# Patient Record
Sex: MALE | Race: ASIAN | NOT HISPANIC OR LATINO
[De-identification: names, ages, dates, MRNs, and addresses within clinical notes are randomized per-mention and may not be internally consistent; named-entity substitution may affect disease eponyms.]

---

## 2022-07-20 ENCOUNTER — APPOINTMENT (OUTPATIENT)
Dept: ORTHOPEDIC SURGERY | Facility: CLINIC | Age: 47
End: 2022-07-20

## 2022-07-20 VITALS — HEIGHT: 69 IN | WEIGHT: 160 LBS | BODY MASS INDEX: 23.7 KG/M2

## 2022-07-20 DIAGNOSIS — Z78.9 OTHER SPECIFIED HEALTH STATUS: ICD-10-CM

## 2022-07-20 PROBLEM — Z00.00 ENCOUNTER FOR PREVENTIVE HEALTH EXAMINATION: Status: ACTIVE | Noted: 2022-07-20

## 2022-07-20 PROCEDURE — 72070 X-RAY EXAM THORAC SPINE 2VWS: CPT

## 2022-07-20 PROCEDURE — 99204 OFFICE O/P NEW MOD 45 MIN: CPT

## 2022-07-20 PROCEDURE — 72050 X-RAY EXAM NECK SPINE 4/5VWS: CPT

## 2022-07-20 RX ORDER — NAPROXEN 500 MG/1
500 TABLET ORAL
Qty: 60 | Refills: 1 | Status: ACTIVE | COMMUNITY
Start: 2022-07-20 | End: 1900-01-01

## 2022-07-20 RX ORDER — CYCLOBENZAPRINE HYDROCHLORIDE 5 MG/1
5 TABLET, FILM COATED ORAL
Qty: 90 | Refills: 1 | Status: ACTIVE | COMMUNITY
Start: 2022-07-20 | End: 1900-01-01

## 2022-07-20 NOTE — HISTORY OF PRESENT ILLNESS
[Neck] : neck [Mid-back] : mid-back [3] : 3 [Dull/Aching] : dull/aching [Localized] : localized [Intermittent] : intermittent [Bending forward] : bending forward [Physical therapy] : physical therapy [Full time] : Work status: full time [de-identified] : Pt seen by non-spine partners in the past\par \par 7/20/22- 47 y/o RHD M presents for chronic midline C spine pain that worsened after a maneuver by a physical therapist on Friday; no recent injuries. Since this incident, patient has been waking up with his heart racing and associated numbness in b/l hands that resolves after a few minutes. Reports episode of numbness in occipital region as well, but no headaches/dizziness. No shooting pain in b/l UEs. Aggravated by looking over his left shoulder.  Associated T spine pain, alleviated with rest. No prior neck surgeries. Denies b/b dysfunction. \par  [] : Post Surgical Visit: no [FreeTextEntry5] : patient complains of neck and upper back pain since \par no injury, pain worsen after physical therapy \par numbness and tingling in both hands [FreeTextEntry6] : stiff

## 2022-07-20 NOTE — IMAGING
[Straightening consistent with spasm] : Straightening consistent with spasm [Disc space narrowing] : Disc space narrowing [de-identified] : C spine \par Palpation: No spasm in traps, rhomboids, paracervicals. No midline or paraspinal T spine tenderness\par ROM: Full with mild stiffness\par Strength: 5/5 bilateral deltoid, biceps, triceps, wrist flexors, wrist extensors, , abductors\par Sensation: Sensation present to light touch bilateral C5-T1 distributions\par Reflexes: Negative Vail's bilaterally\par  [FreeTextEntry1] : No fracture/dislocation/subluxation

## 2022-07-20 NOTE — ASSESSMENT
[FreeTextEntry1] : PT, meds\par will monitor for radic\par follow up 6 weeks\par \par Muscle Relaxants- To help decrease muscle spasm and assist with pain relief. Advised of sedating effects and instructed not to drive, operate heavy machinery, or take with other sedating medications.\par \par NSAIDs- Patient warned of risk of medication to GI tract, increased blood pressure, cardiac risk, and risk of fluid retention.  Advised to clear medication with internist or PCP if any concurrent health problem with heart, blood pressure, or GI system exists.\par

## 2022-08-21 ENCOUNTER — EMERGENCY (EMERGENCY)
Facility: HOSPITAL | Age: 47
LOS: 0 days | Discharge: HOME | End: 2022-08-22
Attending: EMERGENCY MEDICINE

## 2022-08-21 VITALS
RESPIRATION RATE: 18 BRPM | WEIGHT: 160.06 LBS | OXYGEN SATURATION: 97 % | SYSTOLIC BLOOD PRESSURE: 144 MMHG | DIASTOLIC BLOOD PRESSURE: 84 MMHG | HEIGHT: 69 IN | HEART RATE: 71 BPM | TEMPERATURE: 98 F

## 2022-08-21 DIAGNOSIS — M54.50 LOW BACK PAIN, UNSPECIFIED: ICD-10-CM

## 2022-08-21 DIAGNOSIS — R22.0 LOCALIZED SWELLING, MASS AND LUMP, HEAD: ICD-10-CM
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
## 2022-08-21 DIAGNOSIS — S01.511A LACERATION WITHOUT FOREIGN BODY OF LIP, INITIAL ENCOUNTER: ICD-10-CM

## 2022-08-21 DIAGNOSIS — R55 SYNCOPE AND COLLAPSE: ICD-10-CM

## 2022-08-21 DIAGNOSIS — M54.2 CERVICALGIA: ICD-10-CM
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
## 2022-08-21 DIAGNOSIS — R00.1 BRADYCARDIA, UNSPECIFIED: ICD-10-CM

## 2022-08-21 PROCEDURE — 99285 EMERGENCY DEPT VISIT HI MDM: CPT

## 2022-08-21 PROCEDURE — 93010 ELECTROCARDIOGRAM REPORT: CPT

## 2022-08-21 RX ORDER — SODIUM CHLORIDE 9 MG/ML
1000 INJECTION INTRAMUSCULAR; INTRAVENOUS; SUBCUTANEOUS ONCE
Refills: 0 | Status: COMPLETED | OUTPATIENT
Start: 2022-08-21 | End: 2022-08-21

## 2022-08-21 NOTE — ED ADULT TRIAGE NOTE - CHIEF COMPLAINT QUOTE
Patient had syncopal episode this evening, got up to stretch and woke up on the floor. C/o pain to his lip and redness to his forehead. No pmh.

## 2022-08-21 NOTE — ED PROVIDER NOTE - OBJECTIVE STATEMENT
Pertinent PMH/PSH/FHx/SHx and Review of Systems contained within:  Patient presents to the ED for syncope.  Patient with brother who is an RN.  Today was sitting at his computer with feet up.  Says that he's been having lower back pain for weeks now and stood up to stretch, at which point he does not recall much else.  He woke up on the floor with blood in his mouth, denies any incontinence, did not feel weak or confused.  Denies h/o seizures in himself or family, no family h/o sudden cardiac death.  Says that the only new symptom he's been having is neck pain ever since a chiropractor worked on it a few weeks ago, denies any blurry vision, vertigo, headaches.  Denies chest pain, [place].    Relevant PMHx/SHx/SOCHx/FAMH:  Neck and back pain  Patient denies EtOH/tobacco/illicit substance use.    ROS: No fever/chills, No headache/photophobia/eye pain/changes in vision, No ear pain/sore throat/dysphagia, No chest pain/palpitations, no SOB/cough/wheeze/stridor, No abdominal pain, No N/V/D/melena, no dysuria/frequency/discharge, no rash, no changes in neurological status/function.

## 2022-08-21 NOTE — ED PROVIDER NOTE - PHYSICAL EXAMINATION
Gen: Alert, NAD, well appearing  Head: NC, AT, EOMI, normal lids/conjunctiva  ENT: normal hearing, patent oropharynx without erythema/exudate, uvula midline  Neck: +supple, no tenderness/meningismus/JVD, +Trachea midline  Pulm: Bilateral BS, normal resp effort, no wheeze/stridor/retractions  CV: RRR, no M/R/G, +dist pulses  Abd: soft, NT/ND, Negative [place] signs, +BS, no palpable masses  Mskel: no edema/erythema/cyanosis  Skin: no rash, warm/dry  Neuro: AAOx3, no apparent sensory/motor deficits, coordination intact Gen: Alert, NAD, well appearing  Head: NC, minor bump on left forehead, EOMI, normal lids/conjunctiva  ENT: normal hearing, patent oropharynx without erythema/exudate, uvula midline, +lower lip superficial laceration  Neck: +supple, no tenderness/meningismus/JVD, +Trachea midline  Pulm: Bilateral BS, normal resp effort, no wheeze/stridor/retractions  CV: RRR, no M/R/G, +dist pulses  Abd: soft, NT/ND, Negative [place] signs, +BS, no palpable masses  Mskel: no edema/erythema/cyanosis  Skin: no rash, warm/dry  Neuro: AAOx3, no apparent sensory/motor deficits, coordination intact

## 2022-08-21 NOTE — ED PROVIDER NOTE - PATIENT PORTAL LINK FT
You can access the FollowMyHealth Patient Portal offered by [facility] by registering at the following website: [email or web address]. By joining [facility]’s FollowMyHealth portal, you will also be able to view your health information using other applications (apps) compatible with our system.

## 2022-08-21 NOTE — ED PROVIDER NOTE - EKG ADDITIONAL INFORMATION FREE TEXT
sinus patience, rate 55, no st e/d, no twi, qtc 386 sinus patience, rate 55, no st e/d, no twi, qtc 386  Repeat EKG: sinus patience, rate 51, no st e/d, no twi, qtc 398

## 2022-08-22 VITALS
TEMPERATURE: 98 F | SYSTOLIC BLOOD PRESSURE: 111 MMHG | RESPIRATION RATE: 20 BRPM | DIASTOLIC BLOOD PRESSURE: 72 MMHG | HEART RATE: 60 BPM | OXYGEN SATURATION: 98 %

## 2022-08-22 LAB
ALBUMIN SERPL ELPH-MCNC: 3.9 G/DL — SIGNIFICANT CHANGE UP (ref 3.3–5)
ALP SERPL-CCNC: 101 U/L — SIGNIFICANT CHANGE UP (ref 40–120)
ALT FLD-CCNC: 44 U/L — SIGNIFICANT CHANGE UP (ref 12–78)
ANION GAP SERPL CALC-SCNC: 5 MMOL/L — SIGNIFICANT CHANGE UP (ref 5–17)
AST SERPL-CCNC: 17 U/L — SIGNIFICANT CHANGE UP (ref 15–37)
BASOPHILS # BLD AUTO: 0.03 K/UL — SIGNIFICANT CHANGE UP (ref 0–0.2)
BASOPHILS NFR BLD AUTO: 0.3 % — SIGNIFICANT CHANGE UP (ref 0–2)
BILIRUB SERPL-MCNC: 0.5 MG/DL — SIGNIFICANT CHANGE UP (ref 0.2–1.2)
BUN SERPL-MCNC: 24 MG/DL — HIGH (ref 7–23)
CALCIUM SERPL-MCNC: 8.7 MG/DL — SIGNIFICANT CHANGE UP (ref 8.5–10.1)
CHLORIDE SERPL-SCNC: 107 MMOL/L — SIGNIFICANT CHANGE UP (ref 96–108)
CO2 SERPL-SCNC: 29 MMOL/L — SIGNIFICANT CHANGE UP (ref 22–31)
CREAT SERPL-MCNC: 1.13 MG/DL — SIGNIFICANT CHANGE UP (ref 0.5–1.3)
EGFR: 82 ML/MIN/1.73M2 — SIGNIFICANT CHANGE UP
EOSINOPHIL # BLD AUTO: 0.27 K/UL — SIGNIFICANT CHANGE UP (ref 0–0.5)
EOSINOPHIL NFR BLD AUTO: 2.9 % — SIGNIFICANT CHANGE UP (ref 0–6)
GLUCOSE SERPL-MCNC: 108 MG/DL — HIGH (ref 70–99)
HCT VFR BLD CALC: 48.2 % — SIGNIFICANT CHANGE UP (ref 39–50)
HGB BLD-MCNC: 16 G/DL — SIGNIFICANT CHANGE UP (ref 13–17)
IMM GRANULOCYTES NFR BLD AUTO: 0.6 % — SIGNIFICANT CHANGE UP (ref 0–1.5)
LYMPHOCYTES # BLD AUTO: 2.38 K/UL — SIGNIFICANT CHANGE UP (ref 1–3.3)
LYMPHOCYTES # BLD AUTO: 25.3 % — SIGNIFICANT CHANGE UP (ref 13–44)
MAGNESIUM SERPL-MCNC: 2.5 MG/DL — SIGNIFICANT CHANGE UP (ref 1.6–2.6)
MCHC RBC-ENTMCNC: 26.8 PG — LOW (ref 27–34)
MCHC RBC-ENTMCNC: 33.2 G/DL — SIGNIFICANT CHANGE UP (ref 32–36)
MCV RBC AUTO: 80.6 FL — SIGNIFICANT CHANGE UP (ref 80–100)
MONOCYTES # BLD AUTO: 0.79 K/UL — SIGNIFICANT CHANGE UP (ref 0–0.9)
MONOCYTES NFR BLD AUTO: 8.4 % — SIGNIFICANT CHANGE UP (ref 2–14)
NEUTROPHILS # BLD AUTO: 5.87 K/UL — SIGNIFICANT CHANGE UP (ref 1.8–7.4)
NEUTROPHILS NFR BLD AUTO: 62.5 % — SIGNIFICANT CHANGE UP (ref 43–77)
NRBC # BLD: 0 /100 WBCS — SIGNIFICANT CHANGE UP (ref 0–0)
PLATELET # BLD AUTO: 226 K/UL — SIGNIFICANT CHANGE UP (ref 150–400)
POTASSIUM SERPL-MCNC: 4.2 MMOL/L — SIGNIFICANT CHANGE UP (ref 3.5–5.3)
POTASSIUM SERPL-SCNC: 4.2 MMOL/L — SIGNIFICANT CHANGE UP (ref 3.5–5.3)
PROT SERPL-MCNC: 7.9 GM/DL — SIGNIFICANT CHANGE UP (ref 6–8.3)
RBC # BLD: 5.98 M/UL — HIGH (ref 4.2–5.8)
RBC # FLD: 13 % — SIGNIFICANT CHANGE UP (ref 10.3–14.5)
SODIUM SERPL-SCNC: 141 MMOL/L — SIGNIFICANT CHANGE UP (ref 135–145)
TROPONIN I, HIGH SENSITIVITY RESULT: 5.2 NG/L — SIGNIFICANT CHANGE UP
TROPONIN I, HIGH SENSITIVITY RESULT: 5.4 NG/L — SIGNIFICANT CHANGE UP
WBC # BLD: 9.4 K/UL — SIGNIFICANT CHANGE UP (ref 3.8–10.5)
WBC # FLD AUTO: 9.4 K/UL — SIGNIFICANT CHANGE UP (ref 3.8–10.5)

## 2022-08-22 PROCEDURE — 93010 ELECTROCARDIOGRAM REPORT: CPT

## 2022-08-22 PROCEDURE — 70498 CT ANGIOGRAPHY NECK: CPT | Mod: 26,MA

## 2022-08-22 PROCEDURE — 71045 X-RAY EXAM CHEST 1 VIEW: CPT | Mod: 26

## 2022-08-22 PROCEDURE — 70496 CT ANGIOGRAPHY HEAD: CPT | Mod: 26,MA

## 2022-08-22 RX ADMIN — SODIUM CHLORIDE 1000 MILLILITER(S): 9 INJECTION INTRAMUSCULAR; INTRAVENOUS; SUBCUTANEOUS at 00:03

## 2022-08-22 NOTE — ED ADULT NURSE NOTE - OBJECTIVE STATEMENT
A&Ox4, c/o syncopal episode. Pt states he had his feet up on computer, and states having lower back pain. pt states he got up to stretch, and unable to recall past stretching. pt states he woke up on the floor. pt states neck pain, denies: chest pain, sob, nausea, vomiting, blurry vision, headache, weakness.

## 2022-09-17 PROBLEM — Z87.39 PERSONAL HISTORY OF OTHER DISEASES OF THE MUSCULOSKELETAL SYSTEM AND CONNECTIVE TISSUE: Chronic | Status: ACTIVE | Noted: 2022-08-22

## 2022-09-20 ENCOUNTER — APPOINTMENT (OUTPATIENT)
Dept: ORTHOPEDIC SURGERY | Facility: CLINIC | Age: 47
End: 2022-09-20

## 2022-09-20 VITALS — WEIGHT: 160 LBS | HEIGHT: 69 IN | BODY MASS INDEX: 23.7 KG/M2

## 2022-09-20 DIAGNOSIS — M62.830 MUSCLE SPASM OF BACK: ICD-10-CM

## 2022-09-20 DIAGNOSIS — M62.838 OTHER MUSCLE SPASM: ICD-10-CM

## 2022-09-20 PROCEDURE — 99213 OFFICE O/P EST LOW 20 MIN: CPT

## 2022-09-20 NOTE — REASON FOR VISIT
[FreeTextEntry2] : Neck and upper back pain past 3 months . Seen by Dr Viviana Peterson 2 months ago where x ray done , medication and PT was ordered.. Seen by PCP Dr. Leonard 2 weeks later ,and again where MRI was ordered at [facility].

## 2022-09-20 NOTE — HISTORY OF PRESENT ILLNESS
[3] : 3 [1] : 2 [Dull/Aching] : dull/aching [Stabbing] : stabbing [Constant] : constant [Massage] : massage [Bending forward] : bending forward [Full time] : Work status: full time [de-identified] : 09/20/22:  Patient is here today for continued neck pain.  Had seen Dr. Michelle, and his PCP who ordered and MRI.  Has some pain between his shoulder blades when rotating his neck.  Has not been back to playing pickleball. \par \par IMPRESSION: 09/06/22 ZWANGER\par Mild degenerative change with several shallow disc protrusions but no compression the spinal cord or exiting nerve roots.\par \par Dr. Michelle\par 07/20/22: Pt seen by non-spine partners in the past\par \par 7/20/22- 47 y/o RHD M presents for chronic midline C spine pain that worsened after a maneuver by a physical therapist on Friday; no recent injuries. Since this incident, patient has been waking up with his heart racing and associated numbness in b/l hands that resolves after a few minutes. Reports episode of numbness in occipital region as well, but no headaches/dizziness. No shooting pain in b/l UEs. Aggravated by looking over his left shoulder. Associated T spine pain, alleviated with rest. No prior neck surgeries. Denies b/b dysfunction.  [] : no [FreeTextEntry1] : neck and upper back [de-identified] : Dr Leonard [de-identified] : 8/22 [de-identified] : 9/22 [de-identified] :

## 2022-09-20 NOTE — PHYSICAL EXAM
[Normal Mood and Affect] : normal mood and affect [Orientated] : orientated [Able to Communicate] : able to communicate [Well Developed] : well developed [Well Nourished] : well nourished [NL (90)] : forward flexion 90 degrees [NL (30)] : right lateral bending 30 degrees [NL (45)] : extension 45 degrees [NL (40)] : right lateral bending 40 degrees [] : no thoracic paraspinal tenderness

## 2022-09-30 ENCOUNTER — APPOINTMENT (OUTPATIENT)
Dept: MRI IMAGING | Facility: CLINIC | Age: 47
End: 2022-09-30

## 2022-10-14 ENCOUNTER — APPOINTMENT (OUTPATIENT)
Dept: ORTHOPEDIC SURGERY | Facility: CLINIC | Age: 47
End: 2022-10-14

## 2024-07-15 NOTE — ED PROVIDER NOTE - NS ED MD DISPO DISCHARGE
Looked over patient's chart today.  The elevation of CPK seem to be noted after starting on rosuvastatin.  Started him on this for an ASCVD risk score at 10%.  I will go ahead and stop this at this time and add a recheck in August when he is due for the laboratory with you.  May need to discuss future options for cholesterol management at that time.  Please let me know your thoughts, thank you
Home